# Patient Record
Sex: FEMALE | Race: WHITE | HISPANIC OR LATINO | URBAN - METROPOLITAN AREA
[De-identification: names, ages, dates, MRNs, and addresses within clinical notes are randomized per-mention and may not be internally consistent; named-entity substitution may affect disease eponyms.]

---

## 2024-03-29 ENCOUNTER — OV NP (OUTPATIENT)
Dept: URBAN - METROPOLITAN AREA CLINIC 29 | Facility: CLINIC | Age: 19
End: 2024-03-29
Payer: COMMERCIAL

## 2024-03-29 ENCOUNTER — LAB (OUTPATIENT)
Dept: URBAN - METROPOLITAN AREA CLINIC 29 | Facility: CLINIC | Age: 19
End: 2024-03-29

## 2024-03-29 VITALS
HEART RATE: 76 BPM | HEIGHT: 61 IN | BODY MASS INDEX: 21.71 KG/M2 | WEIGHT: 115 LBS | SYSTOLIC BLOOD PRESSURE: 96 MMHG | DIASTOLIC BLOOD PRESSURE: 58 MMHG

## 2024-03-29 DIAGNOSIS — R10.13 EPIGASTRIC PAIN: ICD-10-CM

## 2024-03-29 PROBLEM — 79922009: Status: ACTIVE | Noted: 2024-03-29

## 2024-03-29 PROCEDURE — 99203 OFFICE O/P NEW LOW 30 MIN: CPT | Performed by: INTERNAL MEDICINE

## 2024-03-29 RX ORDER — PANTOPRAZOLE SODIUM 40 MG/1
1 TABLET TABLET, DELAYED RELEASE ORAL ONCE A DAY
Qty: 60 TABLET | Refills: 3 | OUTPATIENT
Start: 2024-03-29

## 2024-03-29 NOTE — HPI-TODAY'S VISIT:
Mrs. Zelaya is a 26-year-old  female who presents today per referral by Dr. Karla Graham for evaluation of abdominal pain and heartburn.  She reports that she has been experiencing epigastric discomfort periodically for over a year.  She also reports that she initially lost weight from 160 to 120 pounds intentionally.  However she reports that she has lost some weight unintentionally since that time.  Recently she had some Chinese food and developed significant abdominal discomfort and diarrhea and her symptoms have been exacerbated.  She complains of chronic heartburn for several years as well as hoarseness.  She denies any difficulty swallowing.  She reports having a colonoscopy last year at [facility] which was reportedly unremarkable.  Otherwise, she has no other GI concerns. Her medical history is significant for asthma.

## 2024-04-10 ENCOUNTER — EGD (OUTPATIENT)
Dept: URBAN - METROPOLITAN AREA SURGERY CENTER 7 | Facility: SURGERY CENTER | Age: 19
End: 2024-04-10

## 2024-05-02 ENCOUNTER — OFFICE VISIT (OUTPATIENT)
Dept: URBAN - METROPOLITAN AREA CLINIC 29 | Facility: CLINIC | Age: 19
End: 2024-05-02